# Patient Record
Sex: MALE | Race: OTHER | HISPANIC OR LATINO | URBAN - METROPOLITAN AREA
[De-identification: names, ages, dates, MRNs, and addresses within clinical notes are randomized per-mention and may not be internally consistent; named-entity substitution may affect disease eponyms.]

---

## 2017-06-30 ENCOUNTER — EMERGENCY (EMERGENCY)
Facility: HOSPITAL | Age: 37
LOS: 1 days | Discharge: HOME | End: 2017-06-30
Attending: EMERGENCY MEDICINE | Admitting: EMERGENCY MEDICINE
Payer: MEDICAID

## 2017-06-30 VITALS
DIASTOLIC BLOOD PRESSURE: 81 MMHG | OXYGEN SATURATION: 100 % | HEART RATE: 80 BPM | RESPIRATION RATE: 16 BRPM | HEIGHT: 66 IN | TEMPERATURE: 99 F | SYSTOLIC BLOOD PRESSURE: 129 MMHG | WEIGHT: 177.91 LBS

## 2017-06-30 VITALS
OXYGEN SATURATION: 100 % | RESPIRATION RATE: 15 BRPM | TEMPERATURE: 99 F | SYSTOLIC BLOOD PRESSURE: 121 MMHG | DIASTOLIC BLOOD PRESSURE: 77 MMHG | HEART RATE: 76 BPM

## 2017-06-30 DIAGNOSIS — R04.0 EPISTAXIS: ICD-10-CM

## 2017-06-30 PROCEDURE — 99283 EMERGENCY DEPT VISIT LOW MDM: CPT

## 2017-06-30 RX ORDER — OXYMETAZOLINE HYDROCHLORIDE 0.5 MG/ML
2 SPRAY NASAL ONCE
Qty: 0 | Refills: 0 | Status: COMPLETED | OUTPATIENT
Start: 2017-06-30 | End: 2017-06-30

## 2017-06-30 RX ADMIN — OXYMETAZOLINE HYDROCHLORIDE 2 SPRAY(S): 0.5 SPRAY NASAL at 20:41

## 2017-06-30 NOTE — ED ADULT NURSE NOTE - OBJECTIVE STATEMENT
pt came in ED from home with c/o sudden onset of nose bleed X 1 hr. alert and oriented X 4. non-labored respiration noted.

## 2017-06-30 NOTE — ED PROVIDER NOTE - OBJECTIVE STATEMENT
36 y/o M with c/o epistaxis x 1 hour.  pt states he gets nose bleeds when he is tired or overworked. Pt denies headache, blurry vision, trauma or blood thinners.

## 2017-06-30 NOTE — ED PROVIDER NOTE - MEDICAL DECISION MAKING DETAILS
pt with hx of previous episodes of nontriggered epistaxis with epistaxis x 1 hour.  pressure, oxymetazoline and monitor

## 2017-06-30 NOTE — ED PROVIDER NOTE - CHPI ED SYMPTOMS NEG
no weakness/no loss of consciousness/no syncope/no nausea/no change in level of consciousness/no blurred vision/no numbness/no vomiting

## 2020-02-07 NOTE — ED ADULT NURSE NOTE - FALLEN IN THE PAST
PHYSICIAN NEXT STEPS:  Review Only    CHIEF COMPLAINT:  Chief Complaint/Protocol Used: Hip Pain  Onset: Right hip pain      ASSESSMENT:  ? Onset: Right hip pain  ? Normal True  ? Location And Radiation: Right hip  ? Quality: Just very bothersome  ? Severity: It is pretty extreme when walking, when I put weight on it  ? Onset: 6 months or more and gradually getting worse  ? Work Or Exercise: No  ? Cause: Diagnosed with \"degenerative lower back disease, arthritis\"  ? Aggravating Factors: Walking, climbing stairs  ? Other Symptoms: Right knee pain  -------------------------------------------------------    DISPOSITION:  Disposition Recommendation: See PCP When Office is Open (within 3 days)  Questions that led to disposition:  ? [1] MODERATE pain (e.g., interferes with normal activities, limping) AND [2] present > 3 days  Patient Directed To: Unspecified  Patient Intended Action: Seek care in the doctor's office       CALL NOTES:  02/06/2020 at 8:26 PM by Pam Lawson  ? Appointment scheduled for tomorrow with Dr. Swift at 2:00 pm.    DISPOSITION OVERRIDE/PROVIDER CONSULT:  Disposition Override: N/A  Override Source: Unspecified  Consulted with PCP: No  Consulted with On-Call Physician: No    CALLER CONTACT INFO:  Name: Sancho Leal (Self)  Phone 1: [phone] (Mobile) - Preferred  Phone 2: [phone] (Home Phone)      ENCOUNTER STARTED:  02/06/20 08:02:33 PM  ENCOUNTER ASSIGNED TO/CLOSED BY:  Pam Lawson @ 02/06/20 08:27:06 PM      -------------------------------------------------------    CARE ADVICE given per Hip Pain guideline.  SEE PCP WITHIN 3 DAYS:  * You need to be seen within 2 or 3 days. Call your doctor during regular office hours and make an appointment. An urgent care center is often the best source of care if your doctor's office is closed or you can't get an appointment. NOTE: If office will   be open tomorrow, tell caller to call then, not in 3 days.   * IF PATIENT HAS NO PCP: An urgent  [facility] is often the best source of care if you do not have a regular doctor you can see in the next couple days. NOTE: Try to help caller find a doctor. Is there a physician referral line or other resource? Having   a PCP or 'medical home' means better long-term care.?; REST YOUR HIP for the next couple days:    * Avoid activities that worsen your pain.   * Reduce activities that put a lot of strain on the hip joint (e.g., stair climbing, running).; PAIN MEDICINES:  * For pain relief, take acetaminophen, ibuprofen, or naproxen.  * Use the lowest amount that makes your pain feel better.  ACETAMINOPHEN (E.G., TYLENOL):   * Take 650 mg (two 325 mg pills) by mouth every 4-6 hours as needed. Each Regular Strength Tylenol pill has 325 mg of acetaminophen. The most you should take each day is 3,250 mg (10 Regular Strength pills a day).  * Another choice is to take 1,000 mg (two 500 mg pills) every 8 hours as needed. Each Extra Strength Tylenol pill has 500 mg of acetaminophen. The most you should take each day is 3,000 mg (6 Extra Strength pills a day).  IBUPROFEN (E.G., MOTRIN, ADVIL):  * Take 400 mg (two 200 mg pills) by mouth every 6 hours as needed.  * Another choice is to take 600 mg (three 200 mg pills) by mouth every 8 hours as needed.  * The most you should take each day is 1,200 mg (six 200 mg pills a day), unless your doctor has told you to take more.  NAPROXEN (E.G., ALEVE):  * Take 220 mg (one 220 mg pill) by mouth every 8 hours as needed. You may take 440 mg (two 220 mg pills) for your first dose.  * The most you should take each day is 660 mg (three 220 mg pills a day), unless your doctor has told you to take more.  EXTRA NOTES:  * Acetaminophen is thought to be safer than ibuprofen or naproxen for people over 65 years old. Acetaminophen is in many OTC and prescription medicines. It might be in more than one medicine that you are taking. You need to be careful and not take an   overdose. An acetaminophen  overdose can hurt the liver.  * [employer], the company that makes Tylenol, has different dosage instructions for Tylenol in [place] and the [place]. In [place], the maximum recommended dose per day is 4,000 mg or twelve (12) Regular-Strength (325 mg) pills. In the [place],   [employer] recommends a maximum dose of ten (10) Regular-Strength (325 mg) pills.  * Before taking any medicine, read all the instructions on the package.      UNDERSTANDS CARE ADVICE: Yes    AGREES WITH CARE ADVICE: Yes    WILL FOLLOW CARE ADVICE: Yes    -------------------------------------------------------   no

## 2022-05-27 NOTE — ED ADULT NURSE NOTE - CAS DISCH CONDITION
Will send in Venlafaxine instead. Discussed this with patient and advised to f/u in 4-6 weeks. Damon Ang [address] Improved

## 2022-10-31 PROBLEM — Z00.00 ENCOUNTER FOR PREVENTIVE HEALTH EXAMINATION: Status: ACTIVE | Noted: 2022-10-31

## 2022-10-31 PROBLEM — R04.0 EPISTAXIS: Chronic | Status: ACTIVE | Noted: 2017-06-30

## 2022-11-10 ENCOUNTER — APPOINTMENT (OUTPATIENT)
Dept: SURGERY | Facility: CLINIC | Age: 42
End: 2022-11-10

## 2022-11-10 VITALS
HEART RATE: 69 BPM | OXYGEN SATURATION: 99 % | HEIGHT: 64 IN | BODY MASS INDEX: 28.51 KG/M2 | WEIGHT: 167 LBS | TEMPERATURE: 98.3 F | DIASTOLIC BLOOD PRESSURE: 88 MMHG | SYSTOLIC BLOOD PRESSURE: 149 MMHG

## 2022-11-10 DIAGNOSIS — Z78.9 OTHER SPECIFIED HEALTH STATUS: ICD-10-CM

## 2022-11-10 DIAGNOSIS — Z80.41 FAMILY HISTORY OF MALIGNANT NEOPLASM OF OVARY: ICD-10-CM

## 2022-11-10 DIAGNOSIS — Z86.2 PERSONAL HISTORY OF DISEASES OF THE BLOOD AND BLOOD-FORMING ORGANS AND CERTAIN DISORDERS INVOLVING THE IMMUNE MECHANISM: ICD-10-CM

## 2022-11-10 DIAGNOSIS — J45.909 UNSPECIFIED ASTHMA, UNCOMPLICATED: ICD-10-CM

## 2022-11-10 DIAGNOSIS — R79.1 ABNORMAL COAGULATION PROFILE: ICD-10-CM

## 2022-11-10 PROCEDURE — 99202 OFFICE O/P NEW SF 15 MIN: CPT

## 2022-11-10 NOTE — PLAN
[FreeTextEntry1] : I have discussed all of the risks, benefits and options. Pt wants to schedule the removal of his back mass.

## 2022-11-10 NOTE — HISTORY OF PRESENT ILLNESS
[de-identified] : mass of his back [de-identified] : 42 year old  male who  presents c/o a mass of his back. Pt states he noticed it five years ago but recently it has increased in size. He denies any pain in the area. Without erythema or drainage.

## 2022-11-10 NOTE — REVIEW OF SYSTEMS
[Easy Bleeding] : no tendency for easy bleeding [Easy Bruising] : a tendency for easy bruising [Swollen Glands] : no swollen glands [Swollen Glands In The Neck] : no swollen glands in the neck [Negative] : Endocrine [FreeTextEntry6] : hx of asthma

## 2023-01-11 ENCOUNTER — OUTPATIENT (OUTPATIENT)
Dept: OUTPATIENT SERVICES | Facility: HOSPITAL | Age: 43
LOS: 1 days | End: 2023-01-11
Payer: MEDICAID

## 2023-01-11 DIAGNOSIS — Z20.828 CONTACT WITH AND (SUSPECTED) EXPOSURE TO OTHER VIRAL COMMUNICABLE DISEASES: ICD-10-CM

## 2023-01-11 LAB — SARS-COV-2 RNA SPEC QL NAA+PROBE: SIGNIFICANT CHANGE UP

## 2023-01-11 PROCEDURE — U0003: CPT

## 2023-01-11 PROCEDURE — U0005: CPT

## 2023-01-12 ENCOUNTER — TRANSCRIPTION ENCOUNTER (OUTPATIENT)
Age: 43
End: 2023-01-12

## 2023-01-13 ENCOUNTER — RESULT REVIEW (OUTPATIENT)
Age: 43
End: 2023-01-13

## 2023-01-13 ENCOUNTER — APPOINTMENT (OUTPATIENT)
Dept: SURGERY | Facility: HOSPITAL | Age: 43
End: 2023-01-13

## 2023-01-13 ENCOUNTER — OUTPATIENT (OUTPATIENT)
Dept: OUTPATIENT SERVICES | Facility: HOSPITAL | Age: 43
LOS: 1 days | End: 2023-01-13
Payer: MEDICAID

## 2023-01-13 DIAGNOSIS — D48.1 NEOPLASM OF UNCERTAIN BEHAVIOR OF CONNECTIVE AND OTHER SOFT TISSUE: ICD-10-CM

## 2023-01-13 PROCEDURE — 21931 EXC BACK LES SC 3 CM/>: CPT

## 2023-01-13 PROCEDURE — 88305 TISSUE EXAM BY PATHOLOGIST: CPT | Mod: 26

## 2023-01-13 PROCEDURE — 88305 TISSUE EXAM BY PATHOLOGIST: CPT

## 2023-01-18 LAB — SURGICAL PATHOLOGY STUDY: SIGNIFICANT CHANGE UP

## 2023-01-19 ENCOUNTER — APPOINTMENT (OUTPATIENT)
Dept: SURGERY | Facility: CLINIC | Age: 43
End: 2023-01-19
Payer: MEDICAID

## 2023-01-19 VITALS — TEMPERATURE: 97.9 F

## 2023-01-19 PROCEDURE — 99024 POSTOP FOLLOW-UP VISIT: CPT

## 2023-01-19 NOTE — HISTORY OF PRESENT ILLNESS
[de-identified] : s/p Excision Mass on Back 01/13/2023 [de-identified] : 41 yo  Male feeling well.  Patient denies fevers, chills, pain or wound drainage.\par \par Pathology: Lipoma

## 2023-01-19 NOTE — PHYSICAL EXAM
[No Rash or Lesion] : No rash or lesion [Calm] : calm [de-identified] : well developed male in no acute distress [de-identified] : Incision clean and closed, No erythema or drainage

## 2023-02-16 ENCOUNTER — APPOINTMENT (OUTPATIENT)
Dept: SURGERY | Facility: CLINIC | Age: 43
End: 2023-02-16
Payer: MEDICAID

## 2023-02-16 DIAGNOSIS — R22.2 LOCALIZED SWELLING, MASS AND LUMP, TRUNK: ICD-10-CM

## 2023-02-16 PROCEDURE — 99024 POSTOP FOLLOW-UP VISIT: CPT

## 2023-02-16 NOTE — PHYSICAL EXAM
[Normal Breath Sounds] : Normal breath sounds [Normal Heart Sounds] : normal heart sounds [Calm] : calm [de-identified] : well developed male in no distress [de-identified] : benign [de-identified] : back incision clean and closed

## 2023-02-16 NOTE — HISTORY OF PRESENT ILLNESS
[de-identified] : s/p excision of back mass on 1/13/23 [de-identified] : Pt is without complaints. Denies any pain or swelling in the area.

## (undated) DEVICE — DRSG TAPE MEDIPORE 3"

## (undated) DEVICE — DRSG TAPE MEDIPORE 4"

## (undated) DEVICE — SOL IRR POUR NS 0.9% 1000ML

## (undated) DEVICE — SUT MONOCRYL 3-0 18" PS-2 UNDYED

## (undated) DEVICE — POSITIONER CUSHION INSERT PRONE VIEW LG

## (undated) DEVICE — ELCTR BOVIE TIP BLADE INSULATED 2.75" EDGE

## (undated) DEVICE — VENODYNE/SCD SLEEVE CALF LARGE

## (undated) DEVICE — SUT POLYSORB 2-0 18" V-20

## (undated) DEVICE — DRSG MASTISOL

## (undated) DEVICE — BLADE SCALPEL SAFETYLOCK #15

## (undated) DEVICE — NDL HYPO SAFE 25G X 1.5" (ORANGE)

## (undated) DEVICE — GLV 8 PROTEXIS (WHITE)

## (undated) DEVICE — PACK MINOR WITH LAP

## (undated) DEVICE — SUT POLYSORB 3-0 30" V-20 UNDYED

## (undated) DEVICE — PLV-SCD MACHINE: Type: DURABLE MEDICAL EQUIPMENT

## (undated) DEVICE — WARMING BLANKET UPPER ADULT

## (undated) DEVICE — VENODYNE/SCD SLEEVE CALF MEDIUM

## (undated) DEVICE — DRAPE TOWEL BLUE 17" X 24"

## (undated) DEVICE — SYR LUER LOK 10CC

## (undated) DEVICE — PLV/PSP-ESU FORCEFX F8J7721A: Type: DURABLE MEDICAL EQUIPMENT

## (undated) DEVICE — WARMING BLANKET LOWER ADULT

## (undated) DEVICE — SUT VLOC 180 4-0 6" P-12 UNDYED

## (undated) DEVICE — DRAPE 3/4 SHEET W REINFORCEMENT 56X77"